# Patient Record
Sex: MALE | Race: WHITE | NOT HISPANIC OR LATINO
[De-identification: names, ages, dates, MRNs, and addresses within clinical notes are randomized per-mention and may not be internally consistent; named-entity substitution may affect disease eponyms.]

---

## 2017-08-14 ENCOUNTER — APPOINTMENT (OUTPATIENT)
Dept: SURGERY | Facility: CLINIC | Age: 22
End: 2017-08-14

## 2019-12-20 ENCOUNTER — APPOINTMENT (OUTPATIENT)
Dept: COLORECTAL SURGERY | Facility: CLINIC | Age: 24
End: 2019-12-20
Payer: COMMERCIAL

## 2019-12-20 VITALS
HEART RATE: 67 BPM | SYSTOLIC BLOOD PRESSURE: 112 MMHG | WEIGHT: 173 LBS | TEMPERATURE: 98.7 F | BODY MASS INDEX: 22.93 KG/M2 | DIASTOLIC BLOOD PRESSURE: 57 MMHG | HEIGHT: 73 IN

## 2019-12-20 DIAGNOSIS — Z83.79 FAMILY HISTORY OF OTHER DISEASES OF THE DIGESTIVE SYSTEM: ICD-10-CM

## 2019-12-20 DIAGNOSIS — Z82.49 FAMILY HISTORY OF ISCHEMIC HEART DISEASE AND OTHER DISEASES OF THE CIRCULATORY SYSTEM: ICD-10-CM

## 2019-12-20 DIAGNOSIS — Z86.13 PERSONAL HISTORY OF MALARIA: ICD-10-CM

## 2019-12-20 DIAGNOSIS — Z72.89 OTHER PROBLEMS RELATED TO LIFESTYLE: ICD-10-CM

## 2019-12-20 DIAGNOSIS — B00.9 HERPESVIRAL INFECTION, UNSPECIFIED: ICD-10-CM

## 2019-12-20 DIAGNOSIS — K59.4 ANAL SPASM: ICD-10-CM

## 2019-12-20 DIAGNOSIS — G47.30 SLEEP APNEA, UNSPECIFIED: ICD-10-CM

## 2019-12-20 DIAGNOSIS — Z86.19 PERSONAL HISTORY OF OTHER INFECTIOUS AND PARASITIC DISEASES: ICD-10-CM

## 2019-12-20 DIAGNOSIS — J45.909 UNSPECIFIED ASTHMA, UNCOMPLICATED: ICD-10-CM

## 2019-12-20 DIAGNOSIS — Z80.1 FAMILY HISTORY OF MALIGNANT NEOPLASM OF TRACHEA, BRONCHUS AND LUNG: ICD-10-CM

## 2019-12-20 DIAGNOSIS — A74.9 CHLAMYDIAL INFECTION, UNSPECIFIED: ICD-10-CM

## 2019-12-20 PROCEDURE — 99202 OFFICE O/P NEW SF 15 MIN: CPT | Mod: 25

## 2019-12-20 PROCEDURE — 46600 DIAGNOSTIC ANOSCOPY SPX: CPT

## 2019-12-20 PROCEDURE — 0249T: CPT

## 2019-12-20 RX ORDER — MULTIVIT-MIN/IRON/FOLIC ACID/K 18-600-40
CAPSULE ORAL
Refills: 0 | Status: ACTIVE | COMMUNITY

## 2019-12-20 RX ORDER — TIOTROPIUM BROMIDE INHALATION SPRAY 1.56 UG/1
SPRAY, METERED RESPIRATORY (INHALATION)
Refills: 0 | Status: ACTIVE | COMMUNITY

## 2019-12-20 RX ORDER — EMTRICITABINE AND TENOFOVIR DISOPROXIL FUMARATE 167; 250 MG/1; MG/1
TABLET, FILM COATED ORAL
Refills: 0 | Status: ACTIVE | COMMUNITY

## 2019-12-20 RX ORDER — ISOTRETINOIN 30 MG/1
CAPSULE, GELATIN COATED ORAL
Refills: 0 | Status: ACTIVE | COMMUNITY

## 2019-12-20 RX ORDER — CHLORHEXIDINE GLUCONATE 4 %
LIQUID (ML) TOPICAL
Refills: 0 | Status: ACTIVE | COMMUNITY

## 2019-12-20 RX ORDER — FLUTICASONE PROPIONATE 50 MCG
SPRAY, SUSPENSION NASAL
Refills: 0 | Status: ACTIVE | COMMUNITY

## 2019-12-20 NOTE — ASSESSMENT
[FreeTextEntry1] : Unclear etiology of current anal spasm and anal pain. Recommend urological assessment of possible underlying prostatitis. Followup culture results.\par \par I suggested there may be a potential underlying anal/levator spasm syndrome.

## 2019-12-20 NOTE — HISTORY OF PRESENT ILLNESS
[FreeTextEntry1] : 23 y/o M presents for evaluation of rectal pain \par Reports rectal pain for the last 1-2 years that began initially with anal receptive sex. Over the past 6 months has began to occur with BM's. Several weeks ago began to also have a penile pain when he went to have a BM or ejaculate \par Reports occasional BRBPR on the TP when wiping. 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 Denies diarrhea  \par MSM, (+) anal receptive sex. Last sexually active 9/19 HIV (-) on PrEP\par Recently tested with PCP for STD with blood and urine \par Never had a colonoscopy

## 2019-12-20 NOTE — PHYSICAL EXAM
[Normal rectal exam] : exam was normal [Wart] : no warts [Normal] : was normal [Excoriation] : no perianal excoriation [FreeTextEntry1] : A lighted anoscope was passed into the anal canal and the entire anal mucosal surface was inspected..  The findings revealed moderate internal hemorrhoids. No masses or lesions were identified.\par \par GC and Viral cultures obtained. [None] : there was no rectal mass

## 2019-12-24 LAB
C TRACH RRNA SPEC QL NAA+PROBE: NOT DETECTED
HHV SPEC CULT: NORMAL
N GONORRHOEA RRNA SPEC QL NAA+PROBE: NOT DETECTED
SOURCE AMPLIFICATION: NORMAL

## 2023-07-13 ENCOUNTER — APPOINTMENT (OUTPATIENT)
Dept: UROLOGY | Facility: CLINIC | Age: 28
End: 2023-07-13
Payer: MEDICAID

## 2023-07-13 VITALS
RESPIRATION RATE: 16 BRPM | HEIGHT: 73 IN | HEART RATE: 53 BPM | DIASTOLIC BLOOD PRESSURE: 62 MMHG | OXYGEN SATURATION: 99 % | WEIGHT: 175 LBS | BODY MASS INDEX: 23.19 KG/M2 | SYSTOLIC BLOOD PRESSURE: 112 MMHG

## 2023-07-13 DIAGNOSIS — N41.1 CHRONIC PROSTATITIS: ICD-10-CM

## 2023-07-13 PROCEDURE — 99204 OFFICE O/P NEW MOD 45 MIN: CPT

## 2023-07-13 PROCEDURE — 51798 US URINE CAPACITY MEASURE: CPT

## 2023-07-13 NOTE — LETTER BODY
[Dear  ___] : Dear  [unfilled], [Courtesy Letter:] : I had the pleasure of seeing your patient, [unfilled], in my office today. [Please see my note below.] : Please see my note below. [Consult Closing:] : Thank you very much for allowing me to participate in the care of this patient.  If you have any questions, please do not hesitate to contact me. [Sincerely,] : Sincerely, [FreeTextEntry3] : Mariah Flores MD\par Director of Robotic Education\par [facility]\par \par [email or web address]\par [phone] ([place])\par [phone]  ([facility])

## 2023-07-13 NOTE — PHYSICAL EXAM
[General Appearance - Well Developed] : well developed [General Appearance - Well Nourished] : well nourished [Normal Appearance] : normal appearance [Well Groomed] : well groomed [General Appearance - In No Acute Distress] : no acute distress [] : no respiratory distress [Respiration, Rhythm And Depth] : normal respiratory rhythm and effort [Exaggerated Use Of Accessory Muscles For Inspiration] : no accessory muscle use [Urethral Meatus] : meatus normal [Penis Abnormality] : normal circumcised penis [Testes Tenderness] : no tenderness of the testes [Testes Mass (___cm)] : there were no testicular masses [Normal Station and Gait] : the gait and station were normal for the patient's age [FreeTextEntry1] : no significant prostatic tenderness, + myofascial tenderness pelvic floor

## 2023-07-13 NOTE — ASSESSMENT
Spoke with the patient.  Advised that black coffee is fine, orange juice should be avoided, and it is okay that she took her multivitamin with iron this weekend (has not taken it since Sunday).  She verbalizes understanding and denies needing anything further at this time.   [FreeTextEntry1] : ERAN GUILLORY is a 27 year male with signs/symptoms consistent with chronic prostatitis/chronic pelvic pain syndrome. \par \par We discussed the relapsing and remitting course of this chronic syndrome as well as our treatment goals of decreasing the severity and frequency of symptoms. To make diagnosis pain should be present for 3 out of the last 6 months. The patient understands that this entity is challenging to treat due in part to the absence of a discrete, identifiable cause. \par \par A multimodal strategy to evaluation and treatment is often necessary. This may include urinalysis and urine culture, two-glass lower urinary tract evaluation, urinary symptom inventory or index, sexual functioning assessment, urinary flow rate, post-void residual urine measurement. It is also important to identify potential triggers and ameliorants, and the impact of other co-existing issues such as GI issues (IBD, IBS, diverticulitis, constipation, etc.), neurological dysfunction, infectious causes, and pelvic floor muscle dysfunction. \par \par We discussed the various treatment modalities, which may be of benefit. The following therapies have shown benefits in placebo- or sham-controlled studies: marked benefit—none; moderate benefit in some selected trials—alpha-blockers (for urinary symptoms) and pregabalin; modest benefit—anti-inflammatory agents (e.g. NSAIDs), phytotherapies ([place] and Querceti), selected neuro-stimulation and directed pelvic floor rehabilitation. We can also attempt extended antimicrobial therapy trial for select, newly diagnosed, antimicrobial-naive patients. \par \par We will start with UA and urine culture to rule out infection/bacterial prostatitis.\par \par - UA, urine culture\par - Alfuzosin x 4 weeks\par - NSAID 400mg tid x 1 week\par - consider trial of abx x 4 weeks if culture positive\par - SItz baths\par - pelvic floor PT \par - fu 3 months

## 2023-07-13 NOTE — HISTORY OF PRESENT ILLNESS
[FreeTextEntry1] : Name ERAN GUILLORY \par MRN [id number] \par  Sep  4 1995 \par Contact Number: [phone]\par -----------------------------------------------------------------------------------------------------------------------------------------\par Date of First Visit: 23\par Referring Provider/PCP: Dr. Aravind Irwin (f.  [phone])\par -----------------------------------------------------------------------------------------------------------------------------------------\par \par CC: pain after urination/ejaculation/chronic rectal pain\par \par History of Present Illness: REAN GUILLORY is a 27 year old male who presents for rectal pain, painful urination, ejaculation.\par \par Patient has had rectal pain for many years. Saw colorectal in 2019 - internal hemorrhoids seen. Recommended f/u with urology and pelvic floor PT. Patient has been doing pelvic floor exercises which has helped but no formal pelvic floor PT.  Would have random burning pain with ejaculation - would last for 10-15 minutes. About 3-4 months again this became more frequent - if had to urinate very badly or after ejaculation. Got tested for Chlamydia, Gonorrhea - all negative. Even treated with course of valtrex.\par \par Over this time, patient reports discomfort with each ejaculation, ranges in severity, sometimes just at tip, other times shooting from pelvis to penis. Recently, patient reports new partner who is he is comfortable with and symptoms have somewhat improved. Also has this sensation after urination, not during, especially after holding urine for too long.\par \par Patient reports when pain is bad feels like has strain at end of urination. Strong stream, but intermittency of stream. \par \par No history of UTIs. Does report STIs: patient MSM, anal receptive intercourse on PrEP, history of Chlamydia, gonorrhea,  HSV. All recent testing with PCP negative. No urethral discharge. No fevers or chills.\par \par Bladder triggers: caffeine 1-2 cups/day, no tea, occasional carbonation, occasional citrus, +acidic foods, occasional tomatoes, + spicy food, occasional pineapple\par \par IPSS 12, QOL 4\par TALIA 14\par PVR 3\par \par PMH: asthma, sleep apnea\par PSH: wisdom teeth removal, colonoscopy \par Family History: no  malignancies\par Social: MSM, never smoker, no illicit drug use, social alcohol, actor and filmmaker\par Meds: Truvada, topical nifedipine\par Allergies: dairy\par ROS: as above

## 2023-07-14 LAB
APPEARANCE: CLEAR
BACTERIA: NEGATIVE /HPF
BILIRUBIN URINE: NEGATIVE
BLOOD URINE: NEGATIVE
CAST: 0 /LPF
COLOR: YELLOW
EPITHELIAL CELLS: 0 /HPF
GLUCOSE QUALITATIVE U: NEGATIVE MG/DL
KETONES URINE: NEGATIVE MG/DL
LEUKOCYTE ESTERASE URINE: NEGATIVE
MICROSCOPIC-UA: NORMAL
NITRITE URINE: NEGATIVE
PH URINE: 7
PROTEIN URINE: NEGATIVE MG/DL
RED BLOOD CELLS URINE: 0 /HPF
SPECIFIC GRAVITY URINE: 1.01
UROBILINOGEN URINE: 0.2 MG/DL
WHITE BLOOD CELLS URINE: 0 /HPF

## 2023-07-17 LAB — BACTERIA UR CULT: NORMAL

## 2023-07-18 ENCOUNTER — NON-APPOINTMENT (OUTPATIENT)
Age: 28
End: 2023-07-18

## 2023-08-14 ENCOUNTER — RX RENEWAL (OUTPATIENT)
Age: 28
End: 2023-08-14

## 2023-08-14 RX ORDER — ALFUZOSIN HYDROCHLORIDE 10 MG/1
10 TABLET, EXTENDED RELEASE ORAL
Qty: 90 | Refills: 0 | Status: ACTIVE | COMMUNITY
Start: 2023-07-13 | End: 1900-01-01

## 2023-10-12 ENCOUNTER — APPOINTMENT (OUTPATIENT)
Dept: UROLOGY | Facility: CLINIC | Age: 28
End: 2023-10-12